# Patient Record
Sex: MALE
[De-identification: names, ages, dates, MRNs, and addresses within clinical notes are randomized per-mention and may not be internally consistent; named-entity substitution may affect disease eponyms.]

---

## 2022-07-30 ENCOUNTER — TELEPHONE ENCOUNTER (OUTPATIENT)
Age: 57
End: 2022-07-30

## 2022-07-31 ENCOUNTER — TELEPHONE ENCOUNTER (OUTPATIENT)
Age: 57
End: 2022-07-31

## 2023-06-13 ENCOUNTER — OFFICE VISIT (OUTPATIENT)
Dept: URBAN - METROPOLITAN AREA CLINIC 13 | Facility: CLINIC | Age: 58
End: 2023-06-13
Payer: COMMERCIAL

## 2023-06-13 DIAGNOSIS — E11.3312 TYPE 2 DIAB WITH MOD NONP RTNOP WITH MACULAR EDEMA, L EYE: Primary | ICD-10-CM

## 2023-06-13 DIAGNOSIS — H25.12 AGE-RELATED NUCLEAR CATARACT, LEFT EYE: ICD-10-CM

## 2023-06-13 PROCEDURE — 67210 TREATMENT OF RETINAL LESION: CPT | Performed by: OPHTHALMOLOGY

## 2023-06-13 PROCEDURE — 92134 CPTRZ OPH DX IMG PST SGM RTA: CPT | Performed by: OPHTHALMOLOGY

## 2023-06-13 PROCEDURE — 92235 FLUORESCEIN ANGRPH MLTIFRAME: CPT | Performed by: OPHTHALMOLOGY

## 2023-06-13 ASSESSMENT — INTRAOCULAR PRESSURE: OS: 12

## 2023-06-13 NOTE — IMPRESSION/PLAN
Impression: Type 2 diab with mod nonp rtnop with macular edema, l eye: P60.6645. OCT OS = sup IRF 
FA OS 06/13/2023 = leak c/w DME OS, but no NVE/NVD Plan: The patient is a type 2 diabetic with nonproliferative diabetic retinopathy and significant diabetic macular edema. The diagnosis was discussed with the patient. BS/BP control were emphasized. The patient would benefit from focal laser treatment, under the Early Treatment of Diabetic Retinopathy Study (ETDRS). Patient may need future injections of anti-VEGF and/or steroid for treatment as well. Timeout was performed before procedure. We have discussed the risks, benefits, and alternatives to focal laser treatment, and the patient elects to proceed with focal laser. 

3m OCT OU re-ramosal Eylea OS

## 2024-10-16 ENCOUNTER — OFFICE VISIT (OUTPATIENT)
Dept: URBAN - METROPOLITAN AREA CLINIC 13 | Facility: CLINIC | Age: 59
End: 2024-10-16
Payer: COMMERCIAL

## 2024-10-16 DIAGNOSIS — H25.12 AGE-RELATED NUCLEAR CATARACT, LEFT EYE: ICD-10-CM

## 2024-10-16 DIAGNOSIS — E11.3312 TYPE 2 DIAB WITH MOD NONP RTNOP WITH MACULAR EDEMA, L EYE: Primary | ICD-10-CM

## 2024-10-16 PROCEDURE — 92134 CPTRZ OPH DX IMG PST SGM RTA: CPT | Performed by: OPHTHALMOLOGY

## 2024-10-16 PROCEDURE — 92235 FLUORESCEIN ANGRPH MLTIFRAME: CPT | Performed by: OPHTHALMOLOGY

## 2024-10-16 PROCEDURE — 99214 OFFICE O/P EST MOD 30 MIN: CPT | Performed by: OPHTHALMOLOGY

## 2024-10-16 ASSESSMENT — INTRAOCULAR PRESSURE: OS: 14
